# Patient Record
Sex: MALE | Race: WHITE | NOT HISPANIC OR LATINO | Employment: FULL TIME
[De-identification: names, ages, dates, MRNs, and addresses within clinical notes are randomized per-mention and may not be internally consistent; named-entity substitution may affect disease eponyms.]

---

## 2019-12-28 ENCOUNTER — EXTERNAL RECORD (OUTPATIENT)
Dept: HEALTH INFORMATION MANAGEMENT | Facility: OTHER | Age: 34
End: 2019-12-28

## 2021-01-30 ENCOUNTER — EXTERNAL RECORD (OUTPATIENT)
Dept: HEALTH INFORMATION MANAGEMENT | Facility: OTHER | Age: 36
End: 2021-01-30

## 2021-02-22 ENCOUNTER — OFFICE VISIT (OUTPATIENT)
Dept: INTERNAL MEDICINE | Age: 36
End: 2021-02-22

## 2021-02-22 VITALS
DIASTOLIC BLOOD PRESSURE: 85 MMHG | HEIGHT: 67 IN | SYSTOLIC BLOOD PRESSURE: 135 MMHG | BODY MASS INDEX: 27.76 KG/M2 | TEMPERATURE: 97.8 F | RESPIRATION RATE: 16 BRPM | WEIGHT: 176.9 LBS | OXYGEN SATURATION: 97 % | HEART RATE: 94 BPM

## 2021-02-22 DIAGNOSIS — R53.83 OTHER FATIGUE: ICD-10-CM

## 2021-02-22 DIAGNOSIS — Z82.61 FAMILY HISTORY OF RHEUMATOID ARTHRITIS: ICD-10-CM

## 2021-02-22 DIAGNOSIS — J30.1 SEASONAL ALLERGIC RHINITIS DUE TO POLLEN: ICD-10-CM

## 2021-02-22 DIAGNOSIS — M25.60 MORNING STIFFNESS OF JOINTS: ICD-10-CM

## 2021-02-22 DIAGNOSIS — Z00.00 ANNUAL PHYSICAL EXAM: Primary | ICD-10-CM

## 2021-02-22 PROCEDURE — 99385 PREV VISIT NEW AGE 18-39: CPT | Performed by: INTERNAL MEDICINE

## 2021-02-22 RX ORDER — MONTELUKAST SODIUM 10 MG/1
10 TABLET ORAL NIGHTLY
COMMUNITY
Start: 2021-02-04 | End: 2023-03-08 | Stop reason: SDUPTHER

## 2021-02-22 ASSESSMENT — PATIENT HEALTH QUESTIONNAIRE - PHQ9
SUM OF ALL RESPONSES TO PHQ9 QUESTIONS 1 AND 2: 0
CLINICAL INTERPRETATION OF PHQ9 SCORE: NO FURTHER SCREENING NEEDED
CLINICAL INTERPRETATION OF PHQ2 SCORE: NO FURTHER SCREENING NEEDED
SUM OF ALL RESPONSES TO PHQ9 QUESTIONS 1 AND 2: 0
1. LITTLE INTEREST OR PLEASURE IN DOING THINGS: NOT AT ALL
2. FEELING DOWN, DEPRESSED OR HOPELESS: NOT AT ALL

## 2021-02-23 ASSESSMENT — ENCOUNTER SYMPTOMS
NAUSEA: 0
NUMBNESS: 0
ABDOMINAL PAIN: 0
ABDOMINAL DISTENTION: 0
APPETITE CHANGE: 0
HEADACHES: 0
CONSTIPATION: 0
TROUBLE SWALLOWING: 0
FATIGUE: 0
DIZZINESS: 0
LIGHT-HEADEDNESS: 0
SORE THROAT: 0
EYE REDNESS: 0
EYE DISCHARGE: 0
WEAKNESS: 0
COUGH: 0
UNEXPECTED WEIGHT CHANGE: 0
DIARRHEA: 0
SINUS PRESSURE: 0
BACK PAIN: 0
FEVER: 0
CHEST TIGHTNESS: 0
NERVOUS/ANXIOUS: 0
SHORTNESS OF BREATH: 0
WHEEZING: 0
BLOOD IN STOOL: 0
EYE PAIN: 0
COLOR CHANGE: 0

## 2021-02-24 ENCOUNTER — LAB SERVICES (OUTPATIENT)
Dept: LAB | Age: 36
End: 2021-02-24

## 2021-02-24 DIAGNOSIS — M25.60 MORNING STIFFNESS OF JOINTS: ICD-10-CM

## 2021-02-24 DIAGNOSIS — J30.1 SEASONAL ALLERGIC RHINITIS DUE TO POLLEN: ICD-10-CM

## 2021-02-24 DIAGNOSIS — Z82.61 FAMILY HISTORY OF RHEUMATOID ARTHRITIS: ICD-10-CM

## 2021-02-24 DIAGNOSIS — Z00.00 ANNUAL PHYSICAL EXAM: ICD-10-CM

## 2021-02-24 DIAGNOSIS — R53.83 OTHER FATIGUE: ICD-10-CM

## 2021-02-24 LAB
ALBUMIN SERPL-MCNC: 4 G/DL (ref 3.6–5.1)
ALBUMIN/GLOB SERPL: 1.2 {RATIO} (ref 1–2.4)
ALP SERPL-CCNC: 92 UNITS/L (ref 45–117)
ALT SERPL-CCNC: 67 UNITS/L
ANION GAP SERPL CALC-SCNC: 12 MMOL/L (ref 10–20)
AST SERPL-CCNC: 39 UNITS/L
BASOPHILS # BLD: 0 K/MCL (ref 0–0.3)
BASOPHILS NFR BLD: 0 %
BILIRUB SERPL-MCNC: 1.9 MG/DL (ref 0.2–1)
BUN SERPL-MCNC: 18 MG/DL (ref 6–20)
BUN/CREAT SERPL: 19 (ref 7–25)
CALCIUM SERPL-MCNC: 9.3 MG/DL (ref 8.4–10.2)
CHLORIDE SERPL-SCNC: 108 MMOL/L (ref 98–107)
CHOLEST SERPL-MCNC: 221 MG/DL
CHOLEST/HDLC SERPL: 4.9 {RATIO}
CO2 SERPL-SCNC: 25 MMOL/L (ref 21–32)
CREAT SERPL-MCNC: 0.95 MG/DL (ref 0.67–1.17)
CRP SERPL-MCNC: <0.3 MG/DL
DEPRECATED RDW RBC: 38.9 FL (ref 39–50)
EOSINOPHIL # BLD: 0.2 K/MCL (ref 0–0.5)
EOSINOPHIL NFR BLD: 2 %
ERYTHROCYTE [DISTWIDTH] IN BLOOD: 11.9 % (ref 11–15)
ERYTHROCYTE [SEDIMENTATION RATE] IN BLOOD BY WESTERGREN METHOD: 18 MM/HR (ref 0–20)
FASTING DURATION TIME PATIENT: 12 HOURS
FASTING DURATION TIME PATIENT: 12 HOURS
GFR SERPLBLD BASED ON 1.73 SQ M-ARVRAT: >90 ML/MIN/1.73M2
GLOBULIN SER-MCNC: 3.3 G/DL (ref 2–4)
GLUCOSE SERPL-MCNC: 92 MG/DL (ref 65–99)
HCT VFR BLD CALC: 46.6 % (ref 39–51)
HDLC SERPL-MCNC: 45 MG/DL
HGB BLD-MCNC: 15.4 G/DL (ref 13–17)
IMM GRANULOCYTES # BLD AUTO: 0 K/MCL (ref 0–0.2)
IMM GRANULOCYTES # BLD: 0 %
LDLC SERPL CALC-MCNC: 141 MG/DL
LYMPHOCYTES # BLD: 2.8 K/MCL (ref 1–4.8)
LYMPHOCYTES NFR BLD: 40 %
MCH RBC QN AUTO: 29.7 PG (ref 26–34)
MCHC RBC AUTO-ENTMCNC: 33 G/DL (ref 32–36.5)
MCV RBC AUTO: 89.8 FL (ref 78–100)
MONOCYTES # BLD: 0.7 K/MCL (ref 0.3–0.9)
MONOCYTES NFR BLD: 10 %
NEUTROPHILS # BLD: 3.4 K/MCL (ref 1.8–7.7)
NEUTROPHILS NFR BLD: 48 %
NONHDLC SERPL-MCNC: 176 MG/DL
NRBC BLD MANUAL-RTO: 0 /100 WBC
PLATELET # BLD AUTO: 255 K/MCL (ref 140–450)
POTASSIUM SERPL-SCNC: 3.8 MMOL/L (ref 3.4–5.1)
PROT SERPL-MCNC: 7.3 G/DL (ref 6.4–8.2)
RBC # BLD: 5.19 MIL/MCL (ref 4.5–5.9)
RHEUMATOID FACT SER NEPH-ACNC: <10 UNITS/ML
SODIUM SERPL-SCNC: 141 MMOL/L (ref 135–145)
TRIGL SERPL-MCNC: 176 MG/DL
TSH SERPL-ACNC: 1.27 MCUNITS/ML (ref 0.35–5)
WBC # BLD: 7.1 K/MCL (ref 4.2–11)

## 2021-02-24 PROCEDURE — 36415 COLL VENOUS BLD VENIPUNCTURE: CPT | Performed by: INTERNAL MEDICINE

## 2021-02-24 PROCEDURE — 80061 LIPID PANEL: CPT | Performed by: INTERNAL MEDICINE

## 2021-02-24 PROCEDURE — 85652 RBC SED RATE AUTOMATED: CPT | Performed by: INTERNAL MEDICINE

## 2021-02-24 PROCEDURE — 86431 RHEUMATOID FACTOR QUANT: CPT | Performed by: INTERNAL MEDICINE

## 2021-02-24 PROCEDURE — 86038 ANTINUCLEAR ANTIBODIES: CPT | Performed by: INTERNAL MEDICINE

## 2021-02-24 PROCEDURE — 86140 C-REACTIVE PROTEIN: CPT | Performed by: INTERNAL MEDICINE

## 2021-02-24 PROCEDURE — 80050 GENERAL HEALTH PANEL: CPT | Performed by: INTERNAL MEDICINE

## 2021-02-25 ENCOUNTER — TELEPHONE (OUTPATIENT)
Dept: INTERNAL MEDICINE | Age: 36
End: 2021-02-25

## 2021-02-25 DIAGNOSIS — R79.89 ELEVATED LFTS: Primary | ICD-10-CM

## 2021-02-25 LAB — ANA SER QL IA: NEGATIVE

## 2021-03-03 ENCOUNTER — LAB SERVICES (OUTPATIENT)
Dept: LAB | Age: 36
End: 2021-03-03

## 2021-03-03 DIAGNOSIS — R79.89 ELEVATED LFTS: ICD-10-CM

## 2021-03-03 LAB
ALBUMIN SERPL-MCNC: 4 G/DL (ref 3.6–5.1)
ALP SERPL-CCNC: 98 UNITS/L (ref 45–117)
ALT SERPL-CCNC: 46 UNITS/L
AST SERPL-CCNC: 25 UNITS/L
BILIRUB CONJ SERPL-MCNC: 0.2 MG/DL (ref 0–0.2)
BILIRUB SERPL-MCNC: 1.6 MG/DL (ref 0.2–1)
PROT SERPL-MCNC: 7.4 G/DL (ref 6.4–8.2)

## 2021-03-03 PROCEDURE — 36415 COLL VENOUS BLD VENIPUNCTURE: CPT | Performed by: INTERNAL MEDICINE

## 2021-03-03 PROCEDURE — 80076 HEPATIC FUNCTION PANEL: CPT | Performed by: INTERNAL MEDICINE

## 2021-03-15 ENCOUNTER — LAB SERVICES (OUTPATIENT)
Dept: LAB | Age: 36
End: 2021-03-15

## 2021-03-15 ENCOUNTER — OFFICE VISIT (OUTPATIENT)
Dept: RHEUMATOLOGY | Age: 36
End: 2021-03-15
Attending: INTERNAL MEDICINE

## 2021-03-15 ENCOUNTER — IMAGING SERVICES (OUTPATIENT)
Dept: GENERAL RADIOLOGY | Age: 36
End: 2021-03-15
Attending: STUDENT IN AN ORGANIZED HEALTH CARE EDUCATION/TRAINING PROGRAM

## 2021-03-15 ENCOUNTER — LAB REQUISITION (OUTPATIENT)
Dept: LAB | Age: 36
End: 2021-03-15

## 2021-03-15 VITALS
OXYGEN SATURATION: 96 % | DIASTOLIC BLOOD PRESSURE: 80 MMHG | HEART RATE: 83 BPM | BODY MASS INDEX: 27.88 KG/M2 | WEIGHT: 178 LBS | SYSTOLIC BLOOD PRESSURE: 130 MMHG

## 2021-03-15 DIAGNOSIS — R53.83 FATIGUE, UNSPECIFIED TYPE: ICD-10-CM

## 2021-03-15 DIAGNOSIS — G89.29 CHRONIC BILATERAL LOW BACK PAIN WITHOUT SCIATICA: ICD-10-CM

## 2021-03-15 DIAGNOSIS — G89.29 CHRONIC BILATERAL LOW BACK PAIN WITHOUT SCIATICA: Primary | ICD-10-CM

## 2021-03-15 DIAGNOSIS — R53.83 OTHER FATIGUE: ICD-10-CM

## 2021-03-15 DIAGNOSIS — M25.50 ARTHRALGIA OF MULTIPLE SITES: ICD-10-CM

## 2021-03-15 DIAGNOSIS — M54.50 CHRONIC BILATERAL LOW BACK PAIN WITHOUT SCIATICA: ICD-10-CM

## 2021-03-15 DIAGNOSIS — M54.50 CHRONIC BILATERAL LOW BACK PAIN WITHOUT SCIATICA: Primary | ICD-10-CM

## 2021-03-15 DIAGNOSIS — M54.50 LOW BACK PAIN: ICD-10-CM

## 2021-03-15 DIAGNOSIS — M25.50 PAIN IN UNSPECIFIED JOINT: ICD-10-CM

## 2021-03-15 DIAGNOSIS — G89.29 OTHER CHRONIC PAIN: ICD-10-CM

## 2021-03-15 LAB
25(OH)D3 SERPL-MCNC: 40.4 NG/ML (ref 30–100)
VIT B12 SERPL-MCNC: 658 PG/ML (ref 193–982)

## 2021-03-15 PROCEDURE — 82306 VITAMIN D 25 HYDROXY: CPT | Performed by: STUDENT IN AN ORGANIZED HEALTH CARE EDUCATION/TRAINING PROGRAM

## 2021-03-15 PROCEDURE — 81374 HLA I TYPING 1 ANTIGEN LR: CPT | Performed by: STUDENT IN AN ORGANIZED HEALTH CARE EDUCATION/TRAINING PROGRAM

## 2021-03-15 PROCEDURE — 82607 VITAMIN B-12: CPT | Performed by: STUDENT IN AN ORGANIZED HEALTH CARE EDUCATION/TRAINING PROGRAM

## 2021-03-15 PROCEDURE — 86200 CCP ANTIBODY: CPT | Performed by: STUDENT IN AN ORGANIZED HEALTH CARE EDUCATION/TRAINING PROGRAM

## 2021-03-15 PROCEDURE — 81374 HLA I TYPING 1 ANTIGEN LR: CPT | Performed by: CLINICAL MEDICAL LABORATORY

## 2021-03-15 PROCEDURE — 72202 X-RAY EXAM SI JOINTS 3/> VWS: CPT | Performed by: RADIOLOGY

## 2021-03-15 PROCEDURE — 36415 COLL VENOUS BLD VENIPUNCTURE: CPT | Performed by: STUDENT IN AN ORGANIZED HEALTH CARE EDUCATION/TRAINING PROGRAM

## 2021-03-15 PROCEDURE — 99204 OFFICE O/P NEW MOD 45 MIN: CPT | Performed by: STUDENT IN AN ORGANIZED HEALTH CARE EDUCATION/TRAINING PROGRAM

## 2021-03-15 ASSESSMENT — LIFESTYLE VARIABLES: SUBSTANCE_ABUSE: 0

## 2021-03-15 ASSESSMENT — ENCOUNTER SYMPTOMS
DIARRHEA: 0
SHORTNESS OF BREATH: 0
HEADACHES: 1
EYE PAIN: 0
FOCAL WEAKNESS: 0
COUGH: 0
CHILLS: 0
BACK PAIN: 1
ABDOMINAL PAIN: 0
EYE REDNESS: 0
SORE THROAT: 0
FEVER: 0

## 2021-03-16 LAB — CCP IGG FLD IA-ACNC: 1 U/ML (ref 0–7)

## 2021-03-17 LAB
DATE OF ANALYSIS SPEC: NORMAL
DATE OF ANALYSIS SPEC: NORMAL
HLA-B27 RELATED AG QL: NEGATIVE
HLA-B27 RELATED AG QL: NEGATIVE
SERVICE CMNT-IMP: NORMAL
SERVICE CMNT-IMP: NORMAL

## 2021-05-11 ENCOUNTER — OFFICE VISIT (OUTPATIENT)
Dept: INTERNAL MEDICINE | Age: 36
End: 2021-05-11

## 2021-05-11 VITALS
WEIGHT: 173.2 LBS | RESPIRATION RATE: 16 BRPM | HEIGHT: 67 IN | DIASTOLIC BLOOD PRESSURE: 81 MMHG | SYSTOLIC BLOOD PRESSURE: 121 MMHG | TEMPERATURE: 97.9 F | OXYGEN SATURATION: 97 % | HEART RATE: 74 BPM | BODY MASS INDEX: 27.18 KG/M2

## 2021-05-11 DIAGNOSIS — M25.60 MORNING STIFFNESS OF JOINTS: ICD-10-CM

## 2021-05-11 DIAGNOSIS — R53.83 OTHER FATIGUE: ICD-10-CM

## 2021-05-11 DIAGNOSIS — G89.29 CHRONIC BILATERAL LOW BACK PAIN WITHOUT SCIATICA: Primary | ICD-10-CM

## 2021-05-11 DIAGNOSIS — E78.2 HYPERLIPIDEMIA, MIXED: ICD-10-CM

## 2021-05-11 DIAGNOSIS — M54.50 CHRONIC BILATERAL LOW BACK PAIN WITHOUT SCIATICA: Primary | ICD-10-CM

## 2021-05-11 DIAGNOSIS — R79.89 ELEVATED LFTS: ICD-10-CM

## 2021-05-11 PROCEDURE — 99214 OFFICE O/P EST MOD 30 MIN: CPT | Performed by: INTERNAL MEDICINE

## 2021-05-11 ASSESSMENT — ENCOUNTER SYMPTOMS
SORE THROAT: 0
NUMBNESS: 0
DIZZINESS: 0
FATIGUE: 1
FEVER: 0
TROUBLE SWALLOWING: 0
ABDOMINAL PAIN: 0
UNEXPECTED WEIGHT CHANGE: 0
EYE PAIN: 0
DIARRHEA: 0
ABDOMINAL DISTENTION: 0
SHORTNESS OF BREATH: 0
EYE DISCHARGE: 0
WHEEZING: 0
WEAKNESS: 0
CHEST TIGHTNESS: 0
LIGHT-HEADEDNESS: 0
EYE REDNESS: 0
APPETITE CHANGE: 0
BACK PAIN: 0
BLOOD IN STOOL: 0
NERVOUS/ANXIOUS: 0
HEADACHES: 0
SINUS PRESSURE: 0
COLOR CHANGE: 0
COUGH: 0
NAUSEA: 0
CONSTIPATION: 0

## 2021-07-21 ENCOUNTER — OFFICE VISIT (OUTPATIENT)
Dept: NEUROLOGY | Facility: CLINIC | Age: 36
End: 2021-07-21
Payer: COMMERCIAL

## 2021-07-21 ENCOUNTER — TELEPHONE (OUTPATIENT)
Dept: NEUROLOGY | Facility: CLINIC | Age: 36
End: 2021-07-21

## 2021-07-21 VITALS
HEART RATE: 86 BPM | BODY MASS INDEX: 26.37 KG/M2 | HEIGHT: 67 IN | WEIGHT: 168 LBS | SYSTOLIC BLOOD PRESSURE: 122 MMHG | DIASTOLIC BLOOD PRESSURE: 98 MMHG

## 2021-07-21 DIAGNOSIS — G44.219 EPISODIC TENSION-TYPE HEADACHE, NOT INTRACTABLE: Primary | ICD-10-CM

## 2021-07-21 DIAGNOSIS — G43.009 MIGRAINE WITHOUT AURA AND WITHOUT STATUS MIGRAINOSUS, NOT INTRACTABLE: ICD-10-CM

## 2021-07-21 PROBLEM — Z88.9 H/O SEASONAL ALLERGIES: Status: ACTIVE | Noted: 2021-07-21

## 2021-07-21 PROBLEM — R53.82 CHRONIC FATIGUE: Status: ACTIVE | Noted: 2021-07-21

## 2021-07-21 PROCEDURE — 3008F BODY MASS INDEX DOCD: CPT | Performed by: OTHER

## 2021-07-21 PROCEDURE — 99205 OFFICE O/P NEW HI 60 MIN: CPT | Performed by: OTHER

## 2021-07-21 PROCEDURE — 3074F SYST BP LT 130 MM HG: CPT | Performed by: OTHER

## 2021-07-21 PROCEDURE — 3080F DIAST BP >= 90 MM HG: CPT | Performed by: OTHER

## 2021-07-21 RX ORDER — SUMATRIPTAN 25 MG/1
TABLET, FILM COATED ORAL
Qty: 9 TABLET | Refills: 0 | Status: SHIPPED | OUTPATIENT
Start: 2021-07-21

## 2021-07-21 RX ORDER — FLUTICASONE PROPIONATE 50 MCG
1 SPRAY, SUSPENSION (ML) NASAL DAILY
COMMUNITY

## 2021-07-21 RX ORDER — MONTELUKAST SODIUM 10 MG/1
10 TABLET ORAL DAILY
COMMUNITY

## 2021-07-21 NOTE — PATIENT INSTRUCTIONS
Managing Tension-type Headache Symptoms  A tension-type headache can develop slowly. Being aware of the symptoms helps you recognize a headache early. Then you can act to reduce pain and relieve tension.  Methods for relieving your symptoms include self-c painful muscle contractions.   Taking medicine safely  Be aware that:  · Taking analgesics (pain relievers) or drinking too much coffee may lead to rebound headaches (frequent or severe headaches that can happen if you miss a dose of medication), so take pa

## 2021-07-21 NOTE — TELEPHONE ENCOUNTER
Contacted [facility] online to initiate authorization for Brain MRI w+wo CPT 37329 to be done at [address] [place] [address].     Status: Approved with authorization #[id number] valid 7/21/21-9/4/21    Patient notified of the above and is driving and would like info sent via mychart-comp

## 2021-07-21 NOTE — PROGRESS NOTES
Neurology Note    7/21/2021  8:07 AM    Nurys Gertrude  28year old, male  9/23/1985  Chief Complaint: Headaches and chronic fatigue    HPI/Subjective:  42-year-old man presenting with chronic headaches and fatigue.   Symptoms ongoing for \"most of his not have any apneic events. He has had 2 sleep studies in the past and these were negative for sleep apnea. He has had his thyroid hormone earlier this year.   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 Negative for appetite change, chills, fever and unexpected weight change. HENT: Negative for hearing loss, tinnitus, trouble swallowing and voice change. Eyes: Negative for pain and visual disturbance.    Respiratory: Negative for chest tightn palsy    CN V   Facial sensation intact. CN VII   Facial expression full, symmetric.      CN VIII   CN VIII normal.     CN IX, X   CN IX normal.   CN X normal.     CN XI   CN XI normal.     CN XII   CN XII normal.     Motor Exam   Muscle bulk: normal  O these fail, or trialing other preventive medications can consider Botox or CGRP antagonist.  Return to clinic in 2 months, earlier if necessary. I will discuss the MRI results with him over the phone and check to see how he is tolerating Imitrex.   He was

## 2021-07-27 ENCOUNTER — HOSPITAL ENCOUNTER (OUTPATIENT)
Dept: MRI IMAGING | Facility: HOSPITAL | Age: 36
Discharge: HOME | End: 2021-07-27
Attending: Other
Payer: COMMERCIAL

## 2021-07-27 DIAGNOSIS — G44.219 EPISODIC TENSION-TYPE HEADACHE, NOT INTRACTABLE: ICD-10-CM

## 2021-07-27 DIAGNOSIS — G43.009 MIGRAINE WITHOUT AURA AND WITHOUT STATUS MIGRAINOSUS, NOT INTRACTABLE: ICD-10-CM

## 2021-07-27 PROCEDURE — 70553 MRI BRAIN STEM W/O & W/DYE: CPT | Performed by: OTHER

## 2021-07-27 PROCEDURE — A9575 INJ GADOTERATE MEGLUMI 0.1ML: HCPCS | Performed by: OTHER

## 2021-07-29 ENCOUNTER — TELEPHONE (OUTPATIENT)
Dept: NEUROLOGY | Facility: CLINIC | Age: 36
End: 2021-07-29

## 2021-07-29 RX ORDER — NORTRIPTYLINE HYDROCHLORIDE 10 MG/1
10 CAPSULE ORAL NIGHTLY
Qty: 90 CAPSULE | Refills: 3 | Status: SHIPPED | OUTPATIENT
Start: 2021-07-29 | End: 2021-09-16

## 2021-09-15 ENCOUNTER — PATIENT MESSAGE (OUTPATIENT)
Dept: NEUROLOGY | Facility: CLINIC | Age: 36
End: 2021-09-15

## 2021-09-16 RX ORDER — NORTRIPTYLINE HYDROCHLORIDE 25 MG/1
25 CAPSULE ORAL NIGHTLY
Qty: 30 CAPSULE | Refills: 11 | Status: SHIPPED | OUTPATIENT
Start: 2021-09-16 | End: 2021-10-16

## 2021-09-16 NOTE — TELEPHONE ENCOUNTER
From: Newton Ott  To: Jorden Jones MD  Sent: 9/15/2021 5:20 PM CDT  Subject: Medication dosage    Hi Dr. Reena Dorantes,    I wanted to check in with you to see if you would recommend increasing my dosage of nortriptyline.  I've been on the 10mg for a

## 2021-09-29 ENCOUNTER — OFFICE VISIT (OUTPATIENT)
Dept: NEUROLOGY | Facility: CLINIC | Age: 36
End: 2021-09-29
Payer: COMMERCIAL

## 2021-09-29 VITALS
WEIGHT: 170 LBS | SYSTOLIC BLOOD PRESSURE: 120 MMHG | BODY MASS INDEX: 26.68 KG/M2 | HEART RATE: 98 BPM | DIASTOLIC BLOOD PRESSURE: 86 MMHG | HEIGHT: 67 IN

## 2021-09-29 DIAGNOSIS — R53.82 CHRONIC FATIGUE: ICD-10-CM

## 2021-09-29 DIAGNOSIS — G43.009 MIGRAINE WITHOUT AURA AND WITHOUT STATUS MIGRAINOSUS, NOT INTRACTABLE: Primary | ICD-10-CM

## 2021-09-29 PROCEDURE — 3079F DIAST BP 80-89 MM HG: CPT | Performed by: OTHER

## 2021-09-29 PROCEDURE — 3074F SYST BP LT 130 MM HG: CPT | Performed by: OTHER

## 2021-09-29 PROCEDURE — 99214 OFFICE O/P EST MOD 30 MIN: CPT | Performed by: OTHER

## 2021-09-29 PROCEDURE — 3008F BODY MASS INDEX DOCD: CPT | Performed by: OTHER

## 2021-09-29 NOTE — PATIENT INSTRUCTIONS
Graduated exercise training program for chronic fatigue syndrome. Get at least 8 hours of sleep a night. Put away phones/tablets, turn off computer/TV late at night. Try shifting the nortriptyline earlier in the day.     Low HR likely related to the

## 2021-09-29 NOTE — PROGRESS NOTES
Neurology Note    9/29/2021  8:10 AM    Paddy Purdy  39year old, male  9/23/1985  Chief Complaint: Fatigue, headaches   LOV 7/21/21. Pt here today to f/u headaches. Pt verbalizes 2-3/week. Pt currently taking nortriptyline.  Pt verbalizes he still h fatigue     Episodic tension-type headache, not intractable     Migraine without aura and without status migrainosus, not intractable      Objective:   09/29/21  0805   BP: 120/86   Pulse: 98   Weight: 170 lb (77.1 kg)   Height: 67\"   , Body mass index is other measures to improve sleep, aim for 8 hours per night. Likely the cause for his resting tachycardia. If no improvement in fatigue can consider physical therapy and testing for EBV. Continue regular exercise.  He can call me if he has any new or worseni

## 2021-10-22 ENCOUNTER — PATIENT MESSAGE (OUTPATIENT)
Dept: NEUROLOGY | Facility: CLINIC | Age: 36
End: 2021-10-22

## 2021-10-25 RX ORDER — NORTRIPTYLINE HYDROCHLORIDE 10 MG/1
CAPSULE ORAL
Qty: 49 CAPSULE | Refills: 0 | Status: SHIPPED | OUTPATIENT
Start: 2021-10-25 | End: 2021-12-06

## 2021-10-25 NOTE — TELEPHONE ENCOUNTER
From: Mike Wellington  To: Celine Paulson MD  Sent: 10/22/2021 6:58 PM CDT  Subject: Fast heart rate    Hi Dr. Anselmo Khoury,    I'm following up in my dosage increase of the Nortriptyline.  I think I mentioned to you at our last appointment that I noticed

## 2022-12-29 ENCOUNTER — TELEPHONE (OUTPATIENT)
Dept: ALLERGY | Age: 37
End: 2022-12-29

## 2023-03-07 ASSESSMENT — ENCOUNTER SYMPTOMS
NERVOUS/ANXIOUS: 0
CHEST TIGHTNESS: 0
WHEEZING: 0
SORE THROAT: 0
ABDOMINAL PAIN: 0
DIARRHEA: 0
FACIAL SWELLING: 0
HEADACHES: 0
APPETITE CHANGE: 0
ADENOPATHY: 0
VOMITING: 0
FEVER: 0
SLEEP DISTURBANCE: 0

## 2023-03-08 ENCOUNTER — IMAGING SERVICES (OUTPATIENT)
Dept: GENERAL RADIOLOGY | Age: 38
End: 2023-03-08
Attending: ALLERGY & IMMUNOLOGY

## 2023-03-08 ENCOUNTER — TELEPHONE (OUTPATIENT)
Dept: ALLERGY | Age: 38
End: 2023-03-08

## 2023-03-08 ENCOUNTER — OFFICE VISIT (OUTPATIENT)
Dept: ALLERGY | Age: 38
End: 2023-03-08

## 2023-03-08 VITALS
HEART RATE: 82 BPM | TEMPERATURE: 97.2 F | SYSTOLIC BLOOD PRESSURE: 118 MMHG | HEIGHT: 67 IN | DIASTOLIC BLOOD PRESSURE: 74 MMHG | BODY MASS INDEX: 26.84 KG/M2 | WEIGHT: 171 LBS

## 2023-03-08 DIAGNOSIS — Z88.0 ALLERGY TO PENICILLIN: ICD-10-CM

## 2023-03-08 DIAGNOSIS — J30.9 ALLERGIC RHINOCONJUNCTIVITIS: ICD-10-CM

## 2023-03-08 DIAGNOSIS — H10.10 ALLERGIC RHINOCONJUNCTIVITIS: Primary | ICD-10-CM

## 2023-03-08 DIAGNOSIS — J30.9 ALLERGIC RHINOCONJUNCTIVITIS: Primary | ICD-10-CM

## 2023-03-08 DIAGNOSIS — H10.10 ALLERGIC RHINOCONJUNCTIVITIS: ICD-10-CM

## 2023-03-08 PROCEDURE — 70210 X-RAY EXAM OF SINUSES: CPT | Performed by: RADIOLOGY

## 2023-03-08 PROCEDURE — 95004 PERQ TESTS W/ALRGNC XTRCS: CPT | Performed by: ALLERGY & IMMUNOLOGY

## 2023-03-08 PROCEDURE — 99205 OFFICE O/P NEW HI 60 MIN: CPT | Performed by: ALLERGY & IMMUNOLOGY

## 2023-03-08 PROCEDURE — 95024 IQ TESTS W/ALLERGENIC XTRCS: CPT | Performed by: ALLERGY & IMMUNOLOGY

## 2023-03-08 RX ORDER — AZELASTINE HYDROCHLORIDE 0.5 MG/ML
1 SOLUTION/ DROPS OPHTHALMIC 2 TIMES DAILY PRN
Qty: 1 EACH | Refills: 3 | Status: SHIPPED | OUTPATIENT
Start: 2023-03-08

## 2023-03-08 RX ORDER — MONTELUKAST SODIUM 10 MG/1
10 TABLET ORAL NIGHTLY
Qty: 90 TABLET | Refills: 3 | Status: SHIPPED | OUTPATIENT
Start: 2023-03-08

## 2023-03-08 RX ORDER — FLUTICASONE PROPIONATE 50 MCG
1 SPRAY, SUSPENSION (ML) NASAL
COMMUNITY
Start: 2020-03-10

## 2023-03-08 RX ORDER — LISDEXAMFETAMINE DIMESYLATE 30 MG/1
30 CAPSULE ORAL DAILY
COMMUNITY
Start: 2023-02-16

## 2023-03-08 RX ORDER — OLOPATADINE HYDROCHLORIDE 1 MG/ML
1 SOLUTION/ DROPS OPHTHALMIC 2 TIMES DAILY PRN
Qty: 1 EACH | Refills: 3 | Status: SHIPPED | OUTPATIENT
Start: 2023-03-08

## 2023-03-08 ASSESSMENT — ENCOUNTER SYMPTOMS
EYE ITCHING: 1
RHINORRHEA: 1

## 2023-03-22 ENCOUNTER — PATIENT OUTREACH (OUTPATIENT)
Dept: CASE MANAGEMENT | Age: 38
End: 2023-03-22

## 2023-03-22 NOTE — PROCEDURES
The office order for PCP removal request is Approved and finalized on March 22, 2023.     Thanks,  [facility] [employer]

## 2023-03-23 ENCOUNTER — TELEPHONE (OUTPATIENT)
Dept: ALLERGY | Age: 38
End: 2023-03-23

## 2023-04-06 ENCOUNTER — ALLERGEN MIXING (OUTPATIENT)
Dept: ALLERGY | Age: 38
End: 2023-04-06

## 2023-04-06 DIAGNOSIS — J30.9 ALLERGIC RHINOCONJUNCTIVITIS: Primary | ICD-10-CM

## 2023-04-06 DIAGNOSIS — H10.10 ALLERGIC RHINOCONJUNCTIVITIS: Primary | ICD-10-CM

## 2023-04-06 PROCEDURE — 95165 ANTIGEN THERAPY SERVICES: CPT | Performed by: ALLERGY & IMMUNOLOGY

## 2023-04-24 ENCOUNTER — TELEPHONE (OUTPATIENT)
Dept: ALLERGY | Age: 38
End: 2023-04-24

## 2023-05-22 ENCOUNTER — TELEPHONE (OUTPATIENT)
Dept: ALLERGY | Age: 38
End: 2023-05-22

## 2023-05-31 ENCOUNTER — NURSE ONLY (OUTPATIENT)
Dept: ALLERGY | Age: 38
End: 2023-05-31

## 2023-05-31 DIAGNOSIS — J30.9 ALLERGIC RHINOCONJUNCTIVITIS: Primary | ICD-10-CM

## 2023-05-31 DIAGNOSIS — H10.10 ALLERGIC RHINOCONJUNCTIVITIS: Primary | ICD-10-CM

## 2023-05-31 PROCEDURE — 95117 IMMUNOTHERAPY INJECTIONS: CPT | Performed by: ALLERGY & IMMUNOLOGY

## 2023-06-06 ENCOUNTER — NURSE ONLY (OUTPATIENT)
Dept: ALLERGY | Age: 38
End: 2023-06-06

## 2023-06-06 DIAGNOSIS — J30.9 ALLERGIC RHINITIS, UNSPECIFIED SEASONALITY, UNSPECIFIED TRIGGER: Primary | ICD-10-CM

## 2023-06-06 PROCEDURE — 95117 IMMUNOTHERAPY INJECTIONS: CPT | Performed by: ALLERGY & IMMUNOLOGY

## 2023-06-09 ENCOUNTER — NURSE ONLY (OUTPATIENT)
Dept: ALLERGY | Age: 38
End: 2023-06-09

## 2023-06-09 DIAGNOSIS — J30.9 ALLERGIC RHINOCONJUNCTIVITIS: Primary | ICD-10-CM

## 2023-06-09 DIAGNOSIS — H10.10 ALLERGIC RHINOCONJUNCTIVITIS: Primary | ICD-10-CM

## 2023-06-09 PROCEDURE — 95117 IMMUNOTHERAPY INJECTIONS: CPT | Performed by: ALLERGY & IMMUNOLOGY

## 2023-06-09 PROCEDURE — X1094 NO CHARGE VISIT: HCPCS | Performed by: ALLERGY & IMMUNOLOGY

## 2023-06-16 ENCOUNTER — NURSE ONLY (OUTPATIENT)
Dept: ALLERGY | Age: 38
End: 2023-06-16

## 2023-06-16 DIAGNOSIS — H10.10 ALLERGIC RHINOCONJUNCTIVITIS: ICD-10-CM

## 2023-06-16 DIAGNOSIS — J30.9 ALLERGIC RHINOCONJUNCTIVITIS: ICD-10-CM

## 2023-06-16 PROCEDURE — 95117 IMMUNOTHERAPY INJECTIONS: CPT | Performed by: ALLERGY & IMMUNOLOGY

## 2023-06-19 ENCOUNTER — NURSE ONLY (OUTPATIENT)
Dept: ALLERGY | Age: 38
End: 2023-06-19

## 2023-06-19 DIAGNOSIS — J30.9 ALLERGIC RHINITIS, UNSPECIFIED SEASONALITY, UNSPECIFIED TRIGGER: Primary | ICD-10-CM

## 2023-06-19 PROCEDURE — 95117 IMMUNOTHERAPY INJECTIONS: CPT | Performed by: ALLERGY & IMMUNOLOGY

## 2023-06-23 ENCOUNTER — NURSE ONLY (OUTPATIENT)
Dept: ALLERGY | Age: 38
End: 2023-06-23

## 2023-06-23 DIAGNOSIS — J30.9 ALLERGIC RHINOCONJUNCTIVITIS: ICD-10-CM

## 2023-06-23 DIAGNOSIS — H10.10 ALLERGIC RHINOCONJUNCTIVITIS: ICD-10-CM

## 2023-06-23 PROCEDURE — X1094 NO CHARGE VISIT: HCPCS | Performed by: ALLERGY & IMMUNOLOGY

## 2023-06-23 PROCEDURE — 95117 IMMUNOTHERAPY INJECTIONS: CPT | Performed by: ALLERGY & IMMUNOLOGY

## 2023-06-30 ENCOUNTER — NURSE ONLY (OUTPATIENT)
Dept: ALLERGY | Age: 38
End: 2023-06-30

## 2023-06-30 DIAGNOSIS — J30.9 ALLERGIC RHINOCONJUNCTIVITIS: Primary | ICD-10-CM

## 2023-06-30 DIAGNOSIS — H10.10 ALLERGIC RHINOCONJUNCTIVITIS: Primary | ICD-10-CM

## 2023-06-30 PROCEDURE — 95117 IMMUNOTHERAPY INJECTIONS: CPT | Performed by: ALLERGY & IMMUNOLOGY

## 2023-07-07 ENCOUNTER — NURSE ONLY (OUTPATIENT)
Dept: ALLERGY | Age: 38
End: 2023-07-07

## 2023-07-07 DIAGNOSIS — H10.10 ALLERGIC RHINOCONJUNCTIVITIS: ICD-10-CM

## 2023-07-07 DIAGNOSIS — J30.9 ALLERGIC RHINOCONJUNCTIVITIS: ICD-10-CM

## 2023-07-07 PROCEDURE — 95117 IMMUNOTHERAPY INJECTIONS: CPT | Performed by: ALLERGY & IMMUNOLOGY

## 2023-07-07 PROCEDURE — X1094 NO CHARGE VISIT: HCPCS | Performed by: ALLERGY & IMMUNOLOGY

## 2023-07-14 ENCOUNTER — NURSE ONLY (OUTPATIENT)
Dept: ALLERGY | Age: 38
End: 2023-07-14

## 2023-07-14 DIAGNOSIS — J30.9 ALLERGIC RHINITIS, UNSPECIFIED SEASONALITY, UNSPECIFIED TRIGGER: Primary | ICD-10-CM

## 2023-07-14 PROCEDURE — 95117 IMMUNOTHERAPY INJECTIONS: CPT | Performed by: ALLERGY & IMMUNOLOGY

## 2023-07-21 ENCOUNTER — NURSE ONLY (OUTPATIENT)
Dept: ALLERGY | Age: 38
End: 2023-07-21

## 2023-07-21 DIAGNOSIS — J30.9 ALLERGIC RHINITIS, UNSPECIFIED SEASONALITY, UNSPECIFIED TRIGGER: Primary | ICD-10-CM

## 2023-07-21 PROCEDURE — X1094 NO CHARGE VISIT: HCPCS | Performed by: ALLERGY & IMMUNOLOGY

## 2023-07-24 ENCOUNTER — NURSE ONLY (OUTPATIENT)
Dept: ALLERGY | Age: 38
End: 2023-07-24

## 2023-07-24 DIAGNOSIS — J30.9 ALLERGIC RHINITIS, UNSPECIFIED SEASONALITY, UNSPECIFIED TRIGGER: Primary | ICD-10-CM

## 2023-07-24 PROCEDURE — 95117 IMMUNOTHERAPY INJECTIONS: CPT | Performed by: ALLERGY & IMMUNOLOGY

## 2023-07-28 ENCOUNTER — NURSE ONLY (OUTPATIENT)
Dept: ALLERGY | Age: 38
End: 2023-07-28

## 2023-07-28 DIAGNOSIS — J30.9 ALLERGIC RHINOCONJUNCTIVITIS: ICD-10-CM

## 2023-07-28 DIAGNOSIS — H10.10 ALLERGIC RHINOCONJUNCTIVITIS: ICD-10-CM

## 2023-07-28 PROCEDURE — X1094 NO CHARGE VISIT: HCPCS | Performed by: ALLERGY & IMMUNOLOGY

## 2023-07-28 PROCEDURE — 95117 IMMUNOTHERAPY INJECTIONS: CPT | Performed by: ALLERGY & IMMUNOLOGY

## 2023-08-04 ENCOUNTER — NURSE ONLY (OUTPATIENT)
Dept: ALLERGY | Age: 38
End: 2023-08-04

## 2023-08-04 DIAGNOSIS — J30.9 ALLERGIC RHINOCONJUNCTIVITIS: ICD-10-CM

## 2023-08-04 DIAGNOSIS — H10.10 ALLERGIC RHINOCONJUNCTIVITIS: ICD-10-CM

## 2023-08-04 PROCEDURE — X1094 NO CHARGE VISIT: HCPCS | Performed by: ALLERGY & IMMUNOLOGY

## 2023-08-04 PROCEDURE — 95117 IMMUNOTHERAPY INJECTIONS: CPT | Performed by: ALLERGY & IMMUNOLOGY

## 2023-08-11 ENCOUNTER — NURSE ONLY (OUTPATIENT)
Dept: ALLERGY | Age: 38
End: 2023-08-11

## 2023-08-11 DIAGNOSIS — H10.10 ALLERGIC RHINOCONJUNCTIVITIS: ICD-10-CM

## 2023-08-11 DIAGNOSIS — J30.9 ALLERGIC RHINOCONJUNCTIVITIS: ICD-10-CM

## 2023-08-11 PROCEDURE — 95117 IMMUNOTHERAPY INJECTIONS: CPT | Performed by: ALLERGY & IMMUNOLOGY

## 2023-08-21 ENCOUNTER — NURSE ONLY (OUTPATIENT)
Dept: ALLERGY | Age: 38
End: 2023-08-21

## 2023-08-21 DIAGNOSIS — J30.9 ALLERGIC RHINOCONJUNCTIVITIS: Primary | ICD-10-CM

## 2023-08-21 DIAGNOSIS — H10.10 ALLERGIC RHINOCONJUNCTIVITIS: Primary | ICD-10-CM

## 2023-08-21 PROCEDURE — 95117 IMMUNOTHERAPY INJECTIONS: CPT | Performed by: ALLERGY & IMMUNOLOGY

## 2023-08-25 ENCOUNTER — NURSE ONLY (OUTPATIENT)
Dept: ALLERGY | Age: 38
End: 2023-08-25

## 2023-08-25 DIAGNOSIS — H10.10 ALLERGIC RHINOCONJUNCTIVITIS: ICD-10-CM

## 2023-08-25 DIAGNOSIS — J30.9 ALLERGIC RHINOCONJUNCTIVITIS: ICD-10-CM

## 2023-08-25 PROCEDURE — 95117 IMMUNOTHERAPY INJECTIONS: CPT | Performed by: ALLERGY & IMMUNOLOGY

## 2023-08-28 ENCOUNTER — NURSE ONLY (OUTPATIENT)
Dept: ALLERGY | Age: 38
End: 2023-08-28

## 2023-08-28 DIAGNOSIS — H10.10 ALLERGIC RHINOCONJUNCTIVITIS: Primary | ICD-10-CM

## 2023-08-28 DIAGNOSIS — J30.9 ALLERGIC RHINOCONJUNCTIVITIS: Primary | ICD-10-CM

## 2023-08-28 PROCEDURE — 95117 IMMUNOTHERAPY INJECTIONS: CPT | Performed by: ALLERGY & IMMUNOLOGY

## 2023-09-06 ENCOUNTER — NURSE ONLY (OUTPATIENT)
Dept: ALLERGY | Age: 38
End: 2023-09-06

## 2023-09-06 DIAGNOSIS — J30.9 ALLERGIC RHINOCONJUNCTIVITIS: ICD-10-CM

## 2023-09-06 DIAGNOSIS — H10.10 ALLERGIC RHINOCONJUNCTIVITIS: ICD-10-CM

## 2023-09-06 PROCEDURE — 95117 IMMUNOTHERAPY INJECTIONS: CPT | Performed by: ALLERGY & IMMUNOLOGY

## 2023-09-06 PROCEDURE — X1094 NO CHARGE VISIT: HCPCS | Performed by: ALLERGY & IMMUNOLOGY

## 2023-09-11 ENCOUNTER — NURSE ONLY (OUTPATIENT)
Dept: ALLERGY | Age: 38
End: 2023-09-11

## 2023-09-11 DIAGNOSIS — J30.9 ALLERGIC RHINOCONJUNCTIVITIS: Primary | ICD-10-CM

## 2023-09-11 DIAGNOSIS — H10.10 ALLERGIC RHINOCONJUNCTIVITIS: Primary | ICD-10-CM

## 2023-09-11 PROCEDURE — 95117 IMMUNOTHERAPY INJECTIONS: CPT | Performed by: ALLERGY & IMMUNOLOGY

## 2023-09-13 ASSESSMENT — ENCOUNTER SYMPTOMS
VOMITING: 0
SORE THROAT: 0
ADENOPATHY: 0
DIARRHEA: 0
ABDOMINAL PAIN: 0
APPETITE CHANGE: 0
SLEEP DISTURBANCE: 0
HEADACHES: 0
NERVOUS/ANXIOUS: 0
FEVER: 0
WHEEZING: 0
FACIAL SWELLING: 0
CHEST TIGHTNESS: 0

## 2023-09-15 ENCOUNTER — OFFICE VISIT (OUTPATIENT)
Dept: ALLERGY | Age: 38
End: 2023-09-15

## 2023-09-15 ENCOUNTER — NURSE ONLY (OUTPATIENT)
Dept: ALLERGY | Age: 38
End: 2023-09-15

## 2023-09-15 ENCOUNTER — E-ADVICE (OUTPATIENT)
Dept: ALLERGY | Age: 38
End: 2023-09-15

## 2023-09-15 VITALS
SYSTOLIC BLOOD PRESSURE: 110 MMHG | HEIGHT: 67 IN | DIASTOLIC BLOOD PRESSURE: 70 MMHG | TEMPERATURE: 96.9 F | OXYGEN SATURATION: 99 % | BODY MASS INDEX: 27.62 KG/M2 | WEIGHT: 176 LBS | HEART RATE: 86 BPM

## 2023-09-15 DIAGNOSIS — Z88.1 ALLERGY TO MULTIPLE ANTIBIOTICS: ICD-10-CM

## 2023-09-15 DIAGNOSIS — H10.10 ALLERGIC RHINOCONJUNCTIVITIS: Primary | ICD-10-CM

## 2023-09-15 DIAGNOSIS — Z88.0 ALLERGY TO PENICILLIN: ICD-10-CM

## 2023-09-15 DIAGNOSIS — J30.9 ALLERGIC RHINOCONJUNCTIVITIS: Primary | ICD-10-CM

## 2023-09-15 PROCEDURE — G2212 PROLONG OUTPT/OFFICE VIS: HCPCS | Performed by: ALLERGY & IMMUNOLOGY

## 2023-09-15 PROCEDURE — 99215 OFFICE O/P EST HI 40 MIN: CPT | Performed by: ALLERGY & IMMUNOLOGY

## 2023-09-15 PROCEDURE — 95117 IMMUNOTHERAPY INJECTIONS: CPT | Performed by: ALLERGY & IMMUNOLOGY

## 2023-09-15 RX ORDER — GALCANEZUMAB 120 MG/ML
INJECTION, SOLUTION SUBCUTANEOUS
COMMUNITY
Start: 2023-08-30

## 2023-09-15 RX ORDER — ZAVEGEPANT 10 MG/.1ML
SPRAY NASAL
COMMUNITY
Start: 2023-07-26

## 2023-09-18 ENCOUNTER — NURSE ONLY (OUTPATIENT)
Dept: ALLERGY | Age: 38
End: 2023-09-18

## 2023-09-18 DIAGNOSIS — J30.9 ALLERGIC RHINOCONJUNCTIVITIS: Primary | ICD-10-CM

## 2023-09-18 DIAGNOSIS — H10.10 ALLERGIC RHINOCONJUNCTIVITIS: Primary | ICD-10-CM

## 2023-09-18 PROCEDURE — 95117 IMMUNOTHERAPY INJECTIONS: CPT | Performed by: ALLERGY & IMMUNOLOGY

## 2023-09-25 ENCOUNTER — NURSE ONLY (OUTPATIENT)
Dept: ALLERGY | Age: 38
End: 2023-09-25

## 2023-09-25 DIAGNOSIS — H10.10 ALLERGIC RHINOCONJUNCTIVITIS: ICD-10-CM

## 2023-09-25 DIAGNOSIS — J30.9 ALLERGIC RHINOCONJUNCTIVITIS: ICD-10-CM

## 2023-09-25 PROCEDURE — 95117 IMMUNOTHERAPY INJECTIONS: CPT | Performed by: ALLERGY & IMMUNOLOGY

## 2023-10-06 ENCOUNTER — NURSE ONLY (OUTPATIENT)
Dept: ALLERGY | Age: 38
End: 2023-10-06

## 2023-10-06 DIAGNOSIS — H10.10 ALLERGIC RHINOCONJUNCTIVITIS: ICD-10-CM

## 2023-10-06 DIAGNOSIS — J30.9 ALLERGIC RHINOCONJUNCTIVITIS: ICD-10-CM

## 2023-10-06 PROCEDURE — 95117 IMMUNOTHERAPY INJECTIONS: CPT | Performed by: ALLERGY & IMMUNOLOGY

## 2023-10-13 ENCOUNTER — NURSE ONLY (OUTPATIENT)
Dept: ALLERGY | Age: 38
End: 2023-10-13

## 2023-10-13 DIAGNOSIS — J30.9 ALLERGIC RHINOCONJUNCTIVITIS: Primary | ICD-10-CM

## 2023-10-13 DIAGNOSIS — H10.10 ALLERGIC RHINOCONJUNCTIVITIS: Primary | ICD-10-CM

## 2023-10-13 PROCEDURE — 95117 IMMUNOTHERAPY INJECTIONS: CPT | Performed by: ALLERGY & IMMUNOLOGY

## 2023-10-16 ENCOUNTER — NURSE ONLY (OUTPATIENT)
Dept: ALLERGY | Age: 38
End: 2023-10-16

## 2023-10-16 DIAGNOSIS — H10.10 ALLERGIC RHINOCONJUNCTIVITIS: Primary | ICD-10-CM

## 2023-10-16 DIAGNOSIS — J30.9 ALLERGIC RHINOCONJUNCTIVITIS: Primary | ICD-10-CM

## 2023-10-16 PROCEDURE — 95117 IMMUNOTHERAPY INJECTIONS: CPT | Performed by: ALLERGY & IMMUNOLOGY

## 2023-10-23 ENCOUNTER — OFFICE VISIT (OUTPATIENT)
Dept: ALLERGY | Age: 38
End: 2023-10-23

## 2023-10-23 ENCOUNTER — NURSE ONLY (OUTPATIENT)
Dept: ALLERGY | Age: 38
End: 2023-10-23

## 2023-10-23 VITALS
OXYGEN SATURATION: 97 % | DIASTOLIC BLOOD PRESSURE: 100 MMHG | HEIGHT: 67 IN | HEART RATE: 92 BPM | SYSTOLIC BLOOD PRESSURE: 138 MMHG | BODY MASS INDEX: 27 KG/M2 | WEIGHT: 172 LBS

## 2023-10-23 DIAGNOSIS — J30.9 ALLERGIC RHINOCONJUNCTIVITIS: Primary | ICD-10-CM

## 2023-10-23 DIAGNOSIS — H10.10 ALLERGIC RHINOCONJUNCTIVITIS: Primary | ICD-10-CM

## 2023-10-23 DIAGNOSIS — T78.40XA ALLERGIC REACTION, INITIAL ENCOUNTER: ICD-10-CM

## 2023-10-23 PROCEDURE — 95117 IMMUNOTHERAPY INJECTIONS: CPT | Performed by: ALLERGY & IMMUNOLOGY

## 2023-10-23 PROCEDURE — 99214 OFFICE O/P EST MOD 30 MIN: CPT | Performed by: ALLERGY & IMMUNOLOGY

## 2023-10-23 RX ORDER — LISDEXAMFETAMINE DIMESYLATE CAPSULES 20 MG/1
20 CAPSULE ORAL EVERY MORNING
COMMUNITY

## 2023-10-23 RX ORDER — EPINEPHRINE 0.3 MG/.3ML
0.3 INJECTION SUBCUTANEOUS PRN
Qty: 1 EACH | Refills: 0 | Status: SHIPPED | OUTPATIENT
Start: 2023-10-23

## 2023-10-23 ASSESSMENT — ENCOUNTER SYMPTOMS
ADENOPATHY: 0
FACIAL SWELLING: 0
FEVER: 0
NERVOUS/ANXIOUS: 0
VOMITING: 0
SORE THROAT: 0
WHEEZING: 0
DIARRHEA: 0
NAUSEA: 1
SLEEP DISTURBANCE: 0
ABDOMINAL PAIN: 0
HEADACHES: 0
APPETITE CHANGE: 0
CHEST TIGHTNESS: 0

## 2023-10-24 ENCOUNTER — TELEPHONE (OUTPATIENT)
Dept: ALLERGY | Age: 38
End: 2023-10-24

## 2023-10-31 ENCOUNTER — NURSE ONLY (OUTPATIENT)
Dept: ALLERGY | Age: 38
End: 2023-10-31

## 2023-10-31 DIAGNOSIS — H10.10 ALLERGIC RHINOCONJUNCTIVITIS: Primary | ICD-10-CM

## 2023-10-31 DIAGNOSIS — J30.9 ALLERGIC RHINOCONJUNCTIVITIS: Primary | ICD-10-CM

## 2023-10-31 PROCEDURE — X1094 NO CHARGE VISIT: HCPCS | Performed by: ALLERGY & IMMUNOLOGY

## 2023-10-31 PROCEDURE — 95117 IMMUNOTHERAPY INJECTIONS: CPT | Performed by: ALLERGY & IMMUNOLOGY

## 2023-11-06 ENCOUNTER — NURSE ONLY (OUTPATIENT)
Dept: ALLERGY | Age: 38
End: 2023-11-06

## 2023-11-06 DIAGNOSIS — H10.10 ALLERGIC RHINOCONJUNCTIVITIS: ICD-10-CM

## 2023-11-06 DIAGNOSIS — J30.9 ALLERGIC RHINOCONJUNCTIVITIS: ICD-10-CM

## 2023-11-06 PROCEDURE — 95117 IMMUNOTHERAPY INJECTIONS: CPT | Performed by: ALLERGY & IMMUNOLOGY

## 2023-11-13 ENCOUNTER — NURSE ONLY (OUTPATIENT)
Dept: ALLERGY | Age: 38
End: 2023-11-13

## 2023-11-13 DIAGNOSIS — J30.9 ALLERGIC RHINOCONJUNCTIVITIS: Primary | ICD-10-CM

## 2023-11-13 DIAGNOSIS — H10.10 ALLERGIC RHINOCONJUNCTIVITIS: Primary | ICD-10-CM

## 2023-11-13 PROCEDURE — 95117 IMMUNOTHERAPY INJECTIONS: CPT | Performed by: ALLERGY & IMMUNOLOGY

## 2023-11-22 ENCOUNTER — NURSE ONLY (OUTPATIENT)
Dept: ALLERGY | Age: 38
End: 2023-11-22

## 2023-11-22 DIAGNOSIS — J30.9 ALLERGIC RHINOCONJUNCTIVITIS: Primary | ICD-10-CM

## 2023-11-22 DIAGNOSIS — H10.10 ALLERGIC RHINOCONJUNCTIVITIS: Primary | ICD-10-CM

## 2023-11-22 PROCEDURE — 95117 IMMUNOTHERAPY INJECTIONS: CPT | Performed by: ALLERGY & IMMUNOLOGY

## 2023-11-27 ENCOUNTER — NURSE ONLY (OUTPATIENT)
Dept: ALLERGY | Age: 38
End: 2023-11-27

## 2023-11-27 DIAGNOSIS — H10.10 ALLERGIC RHINOCONJUNCTIVITIS: ICD-10-CM

## 2023-11-27 DIAGNOSIS — J30.9 ALLERGIC RHINOCONJUNCTIVITIS: ICD-10-CM

## 2023-11-27 PROCEDURE — 95117 IMMUNOTHERAPY INJECTIONS: CPT | Performed by: ALLERGY & IMMUNOLOGY

## 2023-12-04 ENCOUNTER — NURSE ONLY (OUTPATIENT)
Dept: ALLERGY | Age: 38
End: 2023-12-04

## 2023-12-04 DIAGNOSIS — J30.9 ALLERGIC RHINOCONJUNCTIVITIS: Primary | ICD-10-CM

## 2023-12-04 DIAGNOSIS — H10.10 ALLERGIC RHINOCONJUNCTIVITIS: Primary | ICD-10-CM

## 2023-12-04 PROCEDURE — 95117 IMMUNOTHERAPY INJECTIONS: CPT | Performed by: ALLERGY & IMMUNOLOGY

## 2023-12-08 ENCOUNTER — NURSE ONLY (OUTPATIENT)
Dept: ALLERGY | Age: 38
End: 2023-12-08

## 2023-12-08 DIAGNOSIS — H10.10 ALLERGIC RHINOCONJUNCTIVITIS: Primary | ICD-10-CM

## 2023-12-08 DIAGNOSIS — J30.9 ALLERGIC RHINOCONJUNCTIVITIS: Primary | ICD-10-CM

## 2023-12-08 PROCEDURE — X1094 NO CHARGE VISIT: HCPCS | Performed by: ALLERGY & IMMUNOLOGY

## 2023-12-08 PROCEDURE — 95117 IMMUNOTHERAPY INJECTIONS: CPT | Performed by: ALLERGY & IMMUNOLOGY

## 2023-12-20 ENCOUNTER — NURSE ONLY (OUTPATIENT)
Dept: ALLERGY | Age: 38
End: 2023-12-20

## 2023-12-20 DIAGNOSIS — J30.9 ALLERGIC RHINOCONJUNCTIVITIS: Primary | ICD-10-CM

## 2023-12-20 DIAGNOSIS — H10.10 ALLERGIC RHINOCONJUNCTIVITIS: Primary | ICD-10-CM

## 2023-12-20 PROCEDURE — 95117 IMMUNOTHERAPY INJECTIONS: CPT | Performed by: ALLERGY & IMMUNOLOGY

## 2024-01-05 ENCOUNTER — NURSE ONLY (OUTPATIENT)
Dept: ALLERGY | Age: 39
End: 2024-01-05

## 2024-01-05 DIAGNOSIS — J30.9 ALLERGIC RHINOCONJUNCTIVITIS: Primary | ICD-10-CM

## 2024-01-05 DIAGNOSIS — H10.10 ALLERGIC RHINOCONJUNCTIVITIS: Primary | ICD-10-CM

## 2024-01-05 PROCEDURE — 95117 IMMUNOTHERAPY INJECTIONS: CPT | Performed by: ALLERGY & IMMUNOLOGY

## 2024-01-05 PROCEDURE — X1094 NO CHARGE VISIT: HCPCS | Performed by: ALLERGY & IMMUNOLOGY

## 2024-01-26 ENCOUNTER — APPOINTMENT (OUTPATIENT)
Dept: ALLERGY | Age: 39
End: 2024-01-26

## 2024-01-26 DIAGNOSIS — J30.9 ALLERGIC RHINOCONJUNCTIVITIS: Primary | ICD-10-CM

## 2024-01-26 DIAGNOSIS — H10.10 ALLERGIC RHINOCONJUNCTIVITIS: Primary | ICD-10-CM

## 2024-02-16 ENCOUNTER — APPOINTMENT (OUTPATIENT)
Dept: ALLERGY | Age: 39
End: 2024-02-16

## 2024-02-16 DIAGNOSIS — H10.10 ALLERGIC RHINOCONJUNCTIVITIS: Primary | ICD-10-CM

## 2024-02-16 DIAGNOSIS — J30.9 ALLERGIC RHINOCONJUNCTIVITIS: Primary | ICD-10-CM

## 2024-03-15 ENCOUNTER — NURSE ONLY (OUTPATIENT)
Dept: ALLERGY | Age: 39
End: 2024-03-15

## 2024-03-15 DIAGNOSIS — H10.10 ALLERGIC RHINOCONJUNCTIVITIS: Primary | ICD-10-CM

## 2024-03-15 DIAGNOSIS — J30.9 ALLERGIC RHINOCONJUNCTIVITIS: Primary | ICD-10-CM

## 2024-03-22 DIAGNOSIS — H10.10 ALLERGIC RHINOCONJUNCTIVITIS: ICD-10-CM

## 2024-03-22 DIAGNOSIS — J30.9 ALLERGIC RHINOCONJUNCTIVITIS: ICD-10-CM

## 2024-03-28 RX ORDER — MONTELUKAST SODIUM 10 MG/1
10 TABLET ORAL NIGHTLY
Qty: 90 TABLET | Refills: 3 | Status: SHIPPED | OUTPATIENT
Start: 2024-03-28

## 2024-04-12 ENCOUNTER — NURSE ONLY (OUTPATIENT)
Dept: ALLERGY | Age: 39
End: 2024-04-12

## 2024-04-12 DIAGNOSIS — H10.10 ALLERGIC RHINOCONJUNCTIVITIS: Primary | ICD-10-CM

## 2024-04-12 DIAGNOSIS — J30.9 ALLERGIC RHINOCONJUNCTIVITIS: Primary | ICD-10-CM

## 2024-04-12 PROCEDURE — X1094 NO CHARGE VISIT: HCPCS | Performed by: ALLERGY & IMMUNOLOGY

## 2024-04-17 ASSESSMENT — ENCOUNTER SYMPTOMS
APPETITE CHANGE: 0
FACIAL SWELLING: 0
HEADACHES: 0
SLEEP DISTURBANCE: 0
ABDOMINAL PAIN: 0
ADENOPATHY: 0
DIARRHEA: 0
CHEST TIGHTNESS: 0
NERVOUS/ANXIOUS: 0
FEVER: 0
SORE THROAT: 0
VOMITING: 0
WHEEZING: 0

## 2024-04-19 ENCOUNTER — APPOINTMENT (OUTPATIENT)
Dept: ALLERGY | Age: 39
End: 2024-04-19

## 2024-04-19 VITALS
BODY MASS INDEX: 27 KG/M2 | HEART RATE: 83 BPM | OXYGEN SATURATION: 98 % | WEIGHT: 172 LBS | HEIGHT: 67 IN | TEMPERATURE: 98.3 F | DIASTOLIC BLOOD PRESSURE: 90 MMHG | SYSTOLIC BLOOD PRESSURE: 128 MMHG

## 2024-04-19 DIAGNOSIS — H10.10 ALLERGIC RHINOCONJUNCTIVITIS: Primary | ICD-10-CM

## 2024-04-19 DIAGNOSIS — Z88.1 ALLERGY TO MULTIPLE ANTIBIOTICS: ICD-10-CM

## 2024-04-19 DIAGNOSIS — J30.9 ALLERGIC RHINOCONJUNCTIVITIS: Primary | ICD-10-CM

## 2024-04-26 ENCOUNTER — ALLERGEN MIXING (OUTPATIENT)
Dept: ALLERGY | Age: 39
End: 2024-04-26

## 2024-04-26 DIAGNOSIS — H10.10 ALLERGIC RHINOCONJUNCTIVITIS: Primary | ICD-10-CM

## 2024-04-26 DIAGNOSIS — J30.9 ALLERGIC RHINOCONJUNCTIVITIS: Primary | ICD-10-CM

## 2024-05-07 ENCOUNTER — APPOINTMENT (OUTPATIENT)
Dept: ALLERGY | Age: 39
End: 2024-05-07

## 2024-05-07 DIAGNOSIS — H10.10 ALLERGIC RHINOCONJUNCTIVITIS: Primary | ICD-10-CM

## 2024-05-07 DIAGNOSIS — J30.9 ALLERGIC RHINOCONJUNCTIVITIS: Primary | ICD-10-CM

## 2024-05-07 PROCEDURE — X1094 NO CHARGE VISIT: HCPCS | Performed by: ALLERGY & IMMUNOLOGY

## 2024-05-07 PROCEDURE — 95117 IMMUNOTHERAPY INJECTIONS: CPT | Performed by: ALLERGY & IMMUNOLOGY

## 2024-05-10 ENCOUNTER — NURSE ONLY (OUTPATIENT)
Dept: ALLERGY | Age: 39
End: 2024-05-10

## 2024-05-10 DIAGNOSIS — J30.9 ALLERGIC RHINOCONJUNCTIVITIS: Primary | ICD-10-CM

## 2024-05-10 DIAGNOSIS — H10.10 ALLERGIC RHINOCONJUNCTIVITIS: Primary | ICD-10-CM

## 2024-05-10 PROCEDURE — X1094 NO CHARGE VISIT: HCPCS | Performed by: ALLERGY & IMMUNOLOGY

## 2024-05-17 ENCOUNTER — APPOINTMENT (OUTPATIENT)
Dept: ALLERGY | Age: 39
End: 2024-05-17

## 2024-05-17 DIAGNOSIS — H10.10 ALLERGIC RHINOCONJUNCTIVITIS: Primary | ICD-10-CM

## 2024-05-17 DIAGNOSIS — J30.9 ALLERGIC RHINOCONJUNCTIVITIS: Primary | ICD-10-CM

## 2024-05-17 PROCEDURE — X1094 NO CHARGE VISIT: HCPCS | Performed by: ALLERGY & IMMUNOLOGY

## 2024-05-24 ENCOUNTER — APPOINTMENT (OUTPATIENT)
Dept: ALLERGY | Age: 39
End: 2024-05-24

## 2024-05-24 DIAGNOSIS — J30.9 ALLERGIC RHINOCONJUNCTIVITIS: Primary | ICD-10-CM

## 2024-05-24 DIAGNOSIS — H10.10 ALLERGIC RHINOCONJUNCTIVITIS: Primary | ICD-10-CM

## 2024-05-24 PROCEDURE — X1094 NO CHARGE VISIT: HCPCS | Performed by: ALLERGY & IMMUNOLOGY

## 2024-06-07 ENCOUNTER — APPOINTMENT (OUTPATIENT)
Dept: ALLERGY | Age: 39
End: 2024-06-07

## 2024-06-07 DIAGNOSIS — H10.10 ALLERGIC RHINOCONJUNCTIVITIS: Primary | ICD-10-CM

## 2024-06-07 DIAGNOSIS — J30.9 ALLERGIC RHINOCONJUNCTIVITIS: Primary | ICD-10-CM

## 2024-07-05 ENCOUNTER — APPOINTMENT (OUTPATIENT)
Dept: ALLERGY | Age: 39
End: 2024-07-05

## 2024-07-05 DIAGNOSIS — H10.10 ALLERGIC RHINOCONJUNCTIVITIS: Primary | ICD-10-CM

## 2024-07-05 DIAGNOSIS — J30.9 ALLERGIC RHINOCONJUNCTIVITIS: Primary | ICD-10-CM

## 2024-08-02 ENCOUNTER — APPOINTMENT (OUTPATIENT)
Dept: ALLERGY | Age: 39
End: 2024-08-02

## 2024-08-02 DIAGNOSIS — H10.10 ALLERGIC RHINOCONJUNCTIVITIS: Primary | ICD-10-CM

## 2024-08-02 DIAGNOSIS — J30.9 ALLERGIC RHINOCONJUNCTIVITIS: Primary | ICD-10-CM

## 2024-08-02 PROCEDURE — X1094 NO CHARGE VISIT: HCPCS | Performed by: ALLERGY & IMMUNOLOGY

## 2024-08-30 ENCOUNTER — APPOINTMENT (OUTPATIENT)
Dept: ALLERGY | Age: 39
End: 2024-08-30

## 2024-08-30 DIAGNOSIS — H10.10 ALLERGIC RHINOCONJUNCTIVITIS: Primary | ICD-10-CM

## 2024-08-30 DIAGNOSIS — J30.9 ALLERGIC RHINOCONJUNCTIVITIS: Primary | ICD-10-CM

## 2024-08-30 PROCEDURE — 95117 IMMUNOTHERAPY INJECTIONS: CPT | Performed by: ALLERGY & IMMUNOLOGY

## 2024-09-27 ENCOUNTER — APPOINTMENT (OUTPATIENT)
Dept: ALLERGY | Age: 39
End: 2024-09-27

## 2024-09-30 ENCOUNTER — APPOINTMENT (OUTPATIENT)
Dept: ALLERGY | Age: 39
End: 2024-09-30

## 2024-09-30 DIAGNOSIS — J30.9 ALLERGIC RHINOCONJUNCTIVITIS: Primary | ICD-10-CM

## 2024-09-30 DIAGNOSIS — H10.10 ALLERGIC RHINOCONJUNCTIVITIS: Primary | ICD-10-CM

## 2024-09-30 PROCEDURE — X1094 NO CHARGE VISIT: HCPCS | Performed by: ALLERGY & IMMUNOLOGY

## 2024-09-30 PROCEDURE — 95117 IMMUNOTHERAPY INJECTIONS: CPT | Performed by: ALLERGY & IMMUNOLOGY

## 2024-10-25 ENCOUNTER — APPOINTMENT (OUTPATIENT)
Dept: ALLERGY | Age: 39
End: 2024-10-25

## 2024-10-25 DIAGNOSIS — J30.9 ALLERGIC RHINOCONJUNCTIVITIS: Primary | ICD-10-CM

## 2024-10-25 DIAGNOSIS — H10.10 ALLERGIC RHINOCONJUNCTIVITIS: Primary | ICD-10-CM

## 2024-11-25 ENCOUNTER — APPOINTMENT (OUTPATIENT)
Dept: ALLERGY | Age: 39
End: 2024-11-25

## 2024-11-25 DIAGNOSIS — J30.9 ALLERGIC RHINOCONJUNCTIVITIS: Primary | ICD-10-CM

## 2024-11-25 DIAGNOSIS — H10.10 ALLERGIC RHINOCONJUNCTIVITIS: Primary | ICD-10-CM

## 2024-11-25 PROCEDURE — 95117 IMMUNOTHERAPY INJECTIONS: CPT | Performed by: ALLERGY & IMMUNOLOGY

## 2024-12-23 ENCOUNTER — APPOINTMENT (OUTPATIENT)
Dept: ALLERGY | Age: 39
End: 2024-12-23

## 2024-12-23 DIAGNOSIS — H10.10 ALLERGIC RHINOCONJUNCTIVITIS: Primary | ICD-10-CM

## 2024-12-23 DIAGNOSIS — J30.9 ALLERGIC RHINOCONJUNCTIVITIS: Primary | ICD-10-CM

## 2024-12-23 PROCEDURE — 95117 IMMUNOTHERAPY INJECTIONS: CPT | Performed by: ALLERGY & IMMUNOLOGY

## 2025-01-20 ENCOUNTER — APPOINTMENT (OUTPATIENT)
Dept: ALLERGY | Age: 40
End: 2025-01-20

## 2025-01-20 DIAGNOSIS — H10.10 ALLERGIC RHINOCONJUNCTIVITIS: Primary | ICD-10-CM

## 2025-01-20 DIAGNOSIS — J30.9 ALLERGIC RHINOCONJUNCTIVITIS: Primary | ICD-10-CM

## 2025-01-20 PROCEDURE — 95117 IMMUNOTHERAPY INJECTIONS: CPT | Performed by: ALLERGY & IMMUNOLOGY

## 2025-02-17 ENCOUNTER — APPOINTMENT (OUTPATIENT)
Dept: ALLERGY | Age: 40
End: 2025-02-17

## 2025-02-17 DIAGNOSIS — H10.10 ALLERGIC RHINOCONJUNCTIVITIS: Primary | ICD-10-CM

## 2025-02-17 DIAGNOSIS — J30.9 ALLERGIC RHINOCONJUNCTIVITIS: Primary | ICD-10-CM

## 2025-02-17 DIAGNOSIS — L29.9 PRURITUS: ICD-10-CM

## 2025-02-17 PROCEDURE — 95117 IMMUNOTHERAPY INJECTIONS: CPT | Performed by: ALLERGY & IMMUNOLOGY

## 2025-02-17 RX ORDER — LORATADINE 10 MG/1
10 TABLET ORAL ONCE
Status: COMPLETED | OUTPATIENT
Start: 2025-02-17 | End: 2025-02-17

## 2025-02-17 RX ADMIN — LORATADINE 10 MG: 10 TABLET ORAL at 17:20

## 2025-03-17 ENCOUNTER — APPOINTMENT (OUTPATIENT)
Dept: ALLERGY | Age: 40
End: 2025-03-17

## 2025-03-17 DIAGNOSIS — H10.10 ALLERGIC RHINOCONJUNCTIVITIS: Primary | ICD-10-CM

## 2025-03-17 DIAGNOSIS — J30.9 ALLERGIC RHINOCONJUNCTIVITIS: Primary | ICD-10-CM

## 2025-03-17 PROCEDURE — 95117 IMMUNOTHERAPY INJECTIONS: CPT | Performed by: ALLERGY & IMMUNOLOGY

## 2025-03-19 DIAGNOSIS — J30.9 ALLERGIC RHINOCONJUNCTIVITIS: ICD-10-CM

## 2025-03-19 DIAGNOSIS — H10.10 ALLERGIC RHINOCONJUNCTIVITIS: ICD-10-CM

## 2025-03-19 RX ORDER — MONTELUKAST SODIUM 10 MG/1
10 TABLET ORAL NIGHTLY
Qty: 90 TABLET | Refills: 3 | Status: SHIPPED | OUTPATIENT
Start: 2025-03-19

## 2025-03-24 ENCOUNTER — ALLERGEN MIXING (OUTPATIENT)
Dept: ALLERGY | Age: 40
End: 2025-03-24

## 2025-03-24 DIAGNOSIS — J30.9 ALLERGIC RHINOCONJUNCTIVITIS: Primary | ICD-10-CM

## 2025-03-24 DIAGNOSIS — H10.10 ALLERGIC RHINOCONJUNCTIVITIS: Primary | ICD-10-CM

## 2025-04-18 ENCOUNTER — APPOINTMENT (OUTPATIENT)
Dept: ALLERGY | Age: 40
End: 2025-04-18

## 2025-04-18 ENCOUNTER — E-ADVICE (OUTPATIENT)
Dept: ALLERGY | Age: 40
End: 2025-04-18

## 2025-04-18 VITALS
TEMPERATURE: 98 F | DIASTOLIC BLOOD PRESSURE: 78 MMHG | OXYGEN SATURATION: 97 % | HEART RATE: 92 BPM | SYSTOLIC BLOOD PRESSURE: 112 MMHG

## 2025-04-18 DIAGNOSIS — H10.10 ALLERGIC RHINOCONJUNCTIVITIS: Primary | ICD-10-CM

## 2025-04-18 DIAGNOSIS — J30.9 ALLERGIC RHINOCONJUNCTIVITIS: Primary | ICD-10-CM

## 2025-04-18 DIAGNOSIS — Z88.0 ALLERGY TO PENICILLIN: ICD-10-CM

## 2025-04-18 DIAGNOSIS — T78.40XA ALLERGIC REACTION, INITIAL ENCOUNTER: ICD-10-CM

## 2025-04-18 PROBLEM — Z88.1 ALLERGY TO MULTIPLE ANTIBIOTICS: Status: ACTIVE | Noted: 2023-03-08

## 2025-04-18 RX ORDER — EPINEPHRINE 0.3 MG/.3ML
0.3 INJECTION SUBCUTANEOUS PRN
Qty: 1 EACH | Refills: 0 | Status: SHIPPED | OUTPATIENT
Start: 2025-04-18

## 2025-04-18 ASSESSMENT — ENCOUNTER SYMPTOMS
APPETITE CHANGE: 0
NERVOUS/ANXIOUS: 0
FACIAL SWELLING: 0
ADENOPATHY: 0
DIARRHEA: 0
VOMITING: 0
FEVER: 0
SLEEP DISTURBANCE: 0
WHEEZING: 0
HEADACHES: 0
ABDOMINAL PAIN: 0
CHEST TIGHTNESS: 0
SORE THROAT: 0

## 2025-04-22 PROCEDURE — 95165 ANTIGEN THERAPY SERVICES: CPT | Performed by: PHYSICIAN ASSISTANT

## 2025-05-16 ENCOUNTER — APPOINTMENT (OUTPATIENT)
Dept: ALLERGY | Age: 40
End: 2025-05-16

## 2025-05-16 DIAGNOSIS — H10.10 ALLERGIC RHINOCONJUNCTIVITIS: Primary | ICD-10-CM

## 2025-05-16 DIAGNOSIS — J30.9 ALLERGIC RHINOCONJUNCTIVITIS: Primary | ICD-10-CM

## 2025-05-23 ENCOUNTER — APPOINTMENT (OUTPATIENT)
Dept: ALLERGY | Age: 40
End: 2025-05-23

## 2025-05-23 DIAGNOSIS — H10.10 ALLERGIC RHINOCONJUNCTIVITIS: Primary | ICD-10-CM

## 2025-05-23 DIAGNOSIS — J30.9 ALLERGIC RHINOCONJUNCTIVITIS: Primary | ICD-10-CM

## 2025-05-30 ENCOUNTER — APPOINTMENT (OUTPATIENT)
Dept: ALLERGY | Age: 40
End: 2025-05-30

## 2025-05-30 DIAGNOSIS — J30.9 ALLERGIC RHINOCONJUNCTIVITIS: Primary | ICD-10-CM

## 2025-05-30 DIAGNOSIS — H10.10 ALLERGIC RHINOCONJUNCTIVITIS: Primary | ICD-10-CM

## 2025-06-06 ENCOUNTER — APPOINTMENT (OUTPATIENT)
Dept: ALLERGY | Age: 40
End: 2025-06-06

## 2025-06-06 DIAGNOSIS — H10.10 ALLERGIC RHINOCONJUNCTIVITIS: Primary | ICD-10-CM

## 2025-06-06 DIAGNOSIS — J30.9 ALLERGIC RHINOCONJUNCTIVITIS: Primary | ICD-10-CM

## 2025-06-20 ENCOUNTER — NURSE ONLY (OUTPATIENT)
Dept: ALLERGY | Age: 40
End: 2025-06-20

## 2025-06-20 ENCOUNTER — APPOINTMENT (OUTPATIENT)
Dept: ALLERGY | Age: 40
End: 2025-06-20

## 2025-06-20 DIAGNOSIS — J30.9 ALLERGIC RHINOCONJUNCTIVITIS: Primary | ICD-10-CM

## 2025-06-20 DIAGNOSIS — H10.10 ALLERGIC RHINOCONJUNCTIVITIS: Primary | ICD-10-CM

## 2025-07-11 ENCOUNTER — APPOINTMENT (OUTPATIENT)
Dept: ALLERGY | Age: 40
End: 2025-07-11

## 2025-07-11 DIAGNOSIS — H10.10 ALLERGIC RHINOCONJUNCTIVITIS: Primary | ICD-10-CM

## 2025-07-11 DIAGNOSIS — J30.9 ALLERGIC RHINOCONJUNCTIVITIS: Primary | ICD-10-CM

## 2025-08-11 ENCOUNTER — APPOINTMENT (OUTPATIENT)
Dept: ALLERGY | Age: 40
End: 2025-08-11

## 2025-08-11 DIAGNOSIS — H10.10 ALLERGIC RHINOCONJUNCTIVITIS: Primary | ICD-10-CM

## 2025-08-11 DIAGNOSIS — J30.9 ALLERGIC RHINOCONJUNCTIVITIS: Primary | ICD-10-CM

## 2025-08-11 PROCEDURE — 95117 IMMUNOTHERAPY INJECTIONS: CPT | Performed by: ALLERGY & IMMUNOLOGY

## 2025-09-08 ENCOUNTER — APPOINTMENT (OUTPATIENT)
Dept: ALLERGY | Age: 40
End: 2025-09-08

## 2025-10-06 ENCOUNTER — APPOINTMENT (OUTPATIENT)
Dept: ALLERGY | Age: 40
End: 2025-10-06

## 2025-11-03 ENCOUNTER — APPOINTMENT (OUTPATIENT)
Dept: ALLERGY | Age: 40
End: 2025-11-03

## 2026-04-17 ENCOUNTER — APPOINTMENT (OUTPATIENT)
Dept: ALLERGY | Age: 41
End: 2026-04-17